# Patient Record
Sex: FEMALE | Race: WHITE | NOT HISPANIC OR LATINO | Employment: FULL TIME | URBAN - METROPOLITAN AREA
[De-identification: names, ages, dates, MRNs, and addresses within clinical notes are randomized per-mention and may not be internally consistent; named-entity substitution may affect disease eponyms.]

---

## 2023-03-31 DIAGNOSIS — I10 HYPERTENSION, UNSPECIFIED TYPE: Primary | ICD-10-CM

## 2023-03-31 DIAGNOSIS — E78.2 MIXED HYPERLIPIDEMIA: ICD-10-CM

## 2023-03-31 RX ORDER — DILTIAZEM HYDROCHLORIDE 180 MG/1
180 CAPSULE, EXTENDED RELEASE ORAL DAILY
Qty: 90 CAPSULE | Refills: 0 | Status: SHIPPED | OUTPATIENT
Start: 2023-03-31 | End: 2023-07-10 | Stop reason: SDUPTHER

## 2023-03-31 RX ORDER — DILTIAZEM HYDROCHLORIDE 180 MG/1
1 CAPSULE, EXTENDED RELEASE ORAL DAILY
COMMUNITY
Start: 2018-12-18 | End: 2023-03-31 | Stop reason: SDUPTHER

## 2023-07-06 RX ORDER — SEMAGLUTIDE 1.34 MG/ML
1 INJECTION, SOLUTION SUBCUTANEOUS
COMMUNITY
Start: 2023-03-24

## 2023-07-06 RX ORDER — METFORMIN HYDROCHLORIDE 500 MG/1
500 TABLET, EXTENDED RELEASE ORAL
COMMUNITY
Start: 2023-01-27 | End: 2023-10-12 | Stop reason: ALTCHOICE

## 2023-07-06 RX ORDER — MONTELUKAST SODIUM 10 MG/1
10 TABLET ORAL NIGHTLY
COMMUNITY
Start: 2022-10-27 | End: 2023-10-26 | Stop reason: ALTCHOICE

## 2023-07-06 RX ORDER — ALBUTEROL SULFATE 90 UG/1
2 AEROSOL, METERED RESPIRATORY (INHALATION) EVERY 4 HOURS PRN
COMMUNITY
Start: 2018-11-24

## 2023-07-10 DIAGNOSIS — I10 HYPERTENSION, UNSPECIFIED TYPE: ICD-10-CM

## 2023-07-10 RX ORDER — DILTIAZEM HYDROCHLORIDE 180 MG/1
180 CAPSULE, EXTENDED RELEASE ORAL DAILY
Qty: 90 CAPSULE | Refills: 2 | Status: SHIPPED | OUTPATIENT
Start: 2023-07-10 | End: 2023-10-12 | Stop reason: SDUPTHER

## 2023-07-10 NOTE — TELEPHONE ENCOUNTER
Rx Refill Request Telephone Encounter    Name:  Stephanie Dumont  :  [id number]  Medication Name:        DILTIAZEM XR 180MG 24HR CAPSULE      Specific Pharmacy location:  [facility]  Date of last appointment:  2023

## 2023-07-10 NOTE — TELEPHONE ENCOUNTER
Rx Refill Request Telephone Encounter    Name:  Stephanie Dumont  :  [id number]  Medication Name:  dilitiazen XR ( DILT-XR) 180 mg 24 hr capsule  Specific Pharmacy location:    [facility] [address] [place] [address]  Phone: [phone] Fax: [phone]  Date of last appointment:  2023  Date of next appointment:  None scheduled  Best number to reach patient:  [phone]

## 2023-10-12 ENCOUNTER — OFFICE VISIT (OUTPATIENT)
Dept: PRIMARY CARE | Facility: CLINIC | Age: 53
End: 2023-10-12
Payer: COMMERCIAL

## 2023-10-12 ENCOUNTER — TELEPHONE (OUTPATIENT)
Dept: PRIMARY CARE | Facility: CLINIC | Age: 53
End: 2023-10-12

## 2023-10-12 VITALS
RESPIRATION RATE: 18 BRPM | HEART RATE: 87 BPM | OXYGEN SATURATION: 100 % | BODY MASS INDEX: 50.78 KG/M2 | WEIGHT: 293 LBS | SYSTOLIC BLOOD PRESSURE: 132 MMHG | TEMPERATURE: 98.3 F | DIASTOLIC BLOOD PRESSURE: 94 MMHG

## 2023-10-12 DIAGNOSIS — F32.A ANXIETY AND DEPRESSION: Primary | ICD-10-CM

## 2023-10-12 DIAGNOSIS — F41.9 ANXIETY AND DEPRESSION: Primary | ICD-10-CM

## 2023-10-12 DIAGNOSIS — G47.00 INSOMNIA, UNSPECIFIED TYPE: ICD-10-CM

## 2023-10-12 DIAGNOSIS — I10 HYPERTENSION, UNSPECIFIED TYPE: ICD-10-CM

## 2023-10-12 PROCEDURE — 3008F BODY MASS INDEX DOCD: CPT | Performed by: NURSE PRACTITIONER

## 2023-10-12 PROCEDURE — 3075F SYST BP GE 130 - 139MM HG: CPT | Performed by: NURSE PRACTITIONER

## 2023-10-12 PROCEDURE — 1036F TOBACCO NON-USER: CPT | Performed by: NURSE PRACTITIONER

## 2023-10-12 PROCEDURE — 99214 OFFICE O/P EST MOD 30 MIN: CPT | Performed by: NURSE PRACTITIONER

## 2023-10-12 PROCEDURE — 3080F DIAST BP >= 90 MM HG: CPT | Performed by: NURSE PRACTITIONER

## 2023-10-12 RX ORDER — TRAZODONE HYDROCHLORIDE 50 MG/1
50 TABLET ORAL NIGHTLY PRN
Qty: 30 TABLET | Refills: 2 | Status: SHIPPED | OUTPATIENT
Start: 2023-10-12 | End: 2024-10-11

## 2023-10-12 RX ORDER — DILTIAZEM HYDROCHLORIDE 180 MG/1
180 CAPSULE, EXTENDED RELEASE ORAL DAILY
Qty: 90 CAPSULE | Refills: 2 | Status: SHIPPED | OUTPATIENT
Start: 2023-10-12

## 2023-10-12 RX ORDER — TRAZODONE HYDROCHLORIDE 50 MG/1
TABLET ORAL
Qty: 30 TABLET | Refills: 0 | Status: SHIPPED | OUTPATIENT
Start: 2023-10-12 | End: 2023-10-12 | Stop reason: DRUGHIGH
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
## 2023-10-12 NOTE — TELEPHONE ENCOUNTER
Patient called regarding a referral to Behavioral Health.Patient would like a call with it.  Stephanie [phone]

## 2023-10-13 NOTE — TELEPHONE ENCOUNTER
Phoned patient for follow-up.  Patient was seen in our office yesterday with depression.  She states she is feeling better today.  She was referred to psychiatry is awaiting appointment.

## 2023-10-18 ENCOUNTER — SOCIAL WORK (OUTPATIENT)
Dept: BEHAVIORAL HEALTH | Facility: CLINIC | Age: 53
End: 2023-10-18
Payer: COMMERCIAL

## 2023-10-18 DIAGNOSIS — F41.9 ANXIETY AND DEPRESSION: ICD-10-CM

## 2023-10-18 DIAGNOSIS — F32.A ANXIETY AND DEPRESSION: ICD-10-CM

## 2023-10-18 PROCEDURE — 90791 PSYCH DIAGNOSTIC EVALUATION: CPT
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
## 2023-10-25 ENCOUNTER — SOCIAL WORK (OUTPATIENT)
Dept: BEHAVIORAL HEALTH | Facility: CLINIC | Age: 53
End: 2023-10-25
Payer: COMMERCIAL

## 2023-10-25 DIAGNOSIS — F43.10 PTSD (POST-TRAUMATIC STRESS DISORDER): ICD-10-CM

## 2023-10-25 PROCEDURE — 90837 PSYTX W PT 60 MINUTES: CPT

## 2023-10-25 NOTE — PROGRESS NOTES
Collaborative Care (CoCM)  Progress Note    Type of Interaction: In Office    Start Time: 3:00pm    End Time: 4:00pm    Patient went back to work one day this past week. Patient states that it was difficult to focus while she was there. Patient continues to drive around during the day and spends time at the library. Patient's sister drives around with her most days. Patient feels guilty that she's doing this because she works full time. Patient processing the police coming to her house. Validated patients feelings, offered support. Provided patient information on grounding and meditation.     Appointment: Scheduled      Interventions Provided: Solution Focused Therapy      Progress Made: Minimum    Response to Intervention: Patient is receptive to using grounding and meditation techniques as coping skills          Plan: Utilize grounding and meditation to help manage anxiety symptoms           Follow Up / Next Appointment:  November 15th at 3pm

## 2023-10-26 ENCOUNTER — OFFICE VISIT (OUTPATIENT)
Dept: BEHAVIORAL HEALTH | Facility: CLINIC | Age: 53
End: 2023-10-26
Payer: COMMERCIAL

## 2023-10-26 VITALS
SYSTOLIC BLOOD PRESSURE: 141 MMHG | HEART RATE: 54 BPM | DIASTOLIC BLOOD PRESSURE: 80 MMHG | BODY MASS INDEX: 45.99 KG/M2 | WEIGHT: 293 LBS | HEIGHT: 67 IN

## 2023-10-26 DIAGNOSIS — F43.0 ACUTE STRESS DISORDER: ICD-10-CM

## 2023-10-26 PROCEDURE — 3008F BODY MASS INDEX DOCD: CPT

## 2023-10-26 PROCEDURE — 1036F TOBACCO NON-USER: CPT

## 2023-10-26 PROCEDURE — 99215 OFFICE O/P EST HI 40 MIN: CPT

## 2023-10-26 RX ORDER — HYDROXYZINE HYDROCHLORIDE 25 MG/1
25 TABLET, FILM COATED ORAL 2 TIMES DAILY PRN
Qty: 60 TABLET | Refills: 0 | Status: SHIPPED | OUTPATIENT
Start: 2023-10-26
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
## 2023-10-28 PROBLEM — F43.0 ACUTE STRESS DISORDER: Status: ACTIVE | Noted: 2023-10-28

## 2023-10-28 NOTE — PATIENT INSTRUCTIONS
Start Hydroxyzine 25mg - twice per day as needed for anxiety and sleep   Please continue seeing Miranda [facility]izo  Follow up visit in 2-3 weeks or sooner if needed  Please call the office with any questions or concerns ([phone])

## 2023-11-15 ENCOUNTER — SOCIAL WORK (OUTPATIENT)
Dept: BEHAVIORAL HEALTH | Facility: CLINIC | Age: 53
End: 2023-11-15
Payer: COMMERCIAL

## 2023-11-15 DIAGNOSIS — F43.10 PTSD (POST-TRAUMATIC STRESS DISORDER): ICD-10-CM

## 2023-11-15 PROCEDURE — 90837 PSYTX W PT 60 MINUTES: CPT

## 2023-11-15 NOTE — PROGRESS NOTES
Collaborative Care (CoCM)  Progress Note    Type of Interaction: In Office    Start Time: 3:00pm    End Time: 4:00pm    Patient has started back at work full time. She's still coming in later in the morning. Patient reports after coming home from work she goes to directly to her bedroom and turns the lights off.  Patient sits in her bedroom till her  comes home from work at  2:30am. She won't go to sleep till he's home. Discussed trying to make herself dinner before going up stairs. Patient thinks about the police  being at her home. Spoke with patient about organizing her house again. She's considering putting some things in storage. Patient discussing her son and will happen moving forward. The unknown makes it difficult to move forward. Provided support and validated feelings. Proved psycho education on trauma.         Appointment: Scheduled      Interventions Provided: Solution Focused Therapy      Progress Made: Moderate    Response to Intervention: Receptive       Plan:   Assist patient in processing trauma and developing coping skills      Follow Up / Next Appointment:  December 6th at 4pm

## 2023-11-16 ENCOUNTER — OFFICE VISIT (OUTPATIENT)
Dept: BEHAVIORAL HEALTH | Facility: CLINIC | Age: 53
End: 2023-11-16
Payer: COMMERCIAL

## 2023-11-16 VITALS
HEIGHT: 67 IN | HEART RATE: 73 BPM | BODY MASS INDEX: 45.99 KG/M2 | SYSTOLIC BLOOD PRESSURE: 164 MMHG | WEIGHT: 293 LBS | DIASTOLIC BLOOD PRESSURE: 84 MMHG

## 2023-11-16 DIAGNOSIS — F43.10 PTSD (POST-TRAUMATIC STRESS DISORDER): ICD-10-CM

## 2023-11-16 DIAGNOSIS — F43.0 ACUTE STRESS DISORDER: ICD-10-CM

## 2023-11-16 PROCEDURE — 1036F TOBACCO NON-USER: CPT

## 2023-11-16 PROCEDURE — 99214 OFFICE O/P EST MOD 30 MIN: CPT

## 2023-11-16 RX ORDER — CYCLOBENZAPRINE HCL 10 MG
10 TABLET ORAL NIGHTLY PRN
COMMUNITY
Start: 2023-11-12

## 2023-11-16 RX ORDER — FLUOXETINE 10 MG/1
10 CAPSULE ORAL DAILY
Qty: 30 CAPSULE | Refills: 1 | Status: SHIPPED | OUTPATIENT
Start: 2023-11-16
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
## 2023-11-17 NOTE — PATIENT INSTRUCTIONS
Start Prozac 10mg daily   Please consider Hydroxyzine 25mg once daily as needed for sleep/anxiety  Follow up visit in 4 weeks or sooner if needed

## 2023-12-06 ENCOUNTER — SOCIAL WORK (OUTPATIENT)
Dept: BEHAVIORAL HEALTH | Facility: CLINIC | Age: 53
End: 2023-12-06
Payer: COMMERCIAL

## 2023-12-06 DIAGNOSIS — F43.10 PTSD (POST-TRAUMATIC STRESS DISORDER): ICD-10-CM

## 2023-12-06 PROCEDURE — 90837 PSYTX W PT 60 MINUTES: CPT

## 2023-12-06 NOTE — PROGRESS NOTES
Collaborative Care (CoCM)  Progress Note    Type of Interaction: In Office    Start Time: 3:00pm    End Time: 4:00pm    Patient states that her son came home shortly before Thanksgiving. She has been spending more time at home since he was released. Patient is reluctant to become too comfortable with the situation should he be sent back to long term. Patient believes things will improve after his court hearing are finished. Patient has been spending more time in the main areas of her house. Patient resigned from her current job. Patient will be starting her new position in two weeks. Patient talking about her son's relationship with his ex-girlfriend. She lives in [place] and they have two children together. Patient hasn't been able to the children in two years. Processed this with patient. Patient is unsure of her holiday plans.     Appointment: Scheduled      Interventions Provided: Solution Focused Therapy      Progress Made: Moderate    Response to Intervention: Receptive     Plan: Continue to assist patient in processing her recent trauma and issues surrounding grief.     Follow Up / Next Appointment:  January 3rd 2pm

## 2024-02-20 ENCOUNTER — PATIENT OUTREACH (OUTPATIENT)
Dept: PRIMARY CARE | Facility: CLINIC | Age: 54
End: 2024-02-20
Payer: COMMERCIAL

## 2024-02-20 NOTE — PROGRESS NOTES
Spoke with patient. She currently is uncertain of which PCP she will be following with. Her current PCP has left the practice and she will be needing to find a new PCP.

## 2024-08-28 DIAGNOSIS — I10 HYPERTENSION, UNSPECIFIED TYPE: ICD-10-CM

## 2024-08-29 RX ORDER — DILTIAZEM HYDROCHLORIDE 180 MG/1
180 CAPSULE, EXTENDED RELEASE ORAL DAILY
Qty: 30 CAPSULE | Refills: 0 | Status: SHIPPED | OUTPATIENT
Start: 2024-08-29 | End: 2024-09-28

## 2024-09-03 ENCOUNTER — APPOINTMENT (OUTPATIENT)
Dept: PRIMARY CARE | Facility: CLINIC | Age: 54
End: 2024-09-03
Payer: COMMERCIAL

## 2024-09-05 ENCOUNTER — APPOINTMENT (OUTPATIENT)
Dept: PRIMARY CARE | Facility: CLINIC | Age: 54
End: 2024-09-05
Payer: COMMERCIAL

## 2024-09-05 VITALS
WEIGHT: 293 LBS | OXYGEN SATURATION: 94 % | HEART RATE: 74 BPM | DIASTOLIC BLOOD PRESSURE: 83 MMHG | TEMPERATURE: 97.8 F | SYSTOLIC BLOOD PRESSURE: 139 MMHG | HEIGHT: 68 IN | BODY MASS INDEX: 44.41 KG/M2

## 2024-09-05 DIAGNOSIS — R73.03 PREDIABETES: ICD-10-CM

## 2024-09-05 DIAGNOSIS — E66.01 CLASS 3 SEVERE OBESITY DUE TO EXCESS CALORIES WITHOUT SERIOUS COMORBIDITY WITH BODY MASS INDEX (BMI) OF 50.0 TO 59.9 IN ADULT (MULTI): Primary | ICD-10-CM

## 2024-09-05 DIAGNOSIS — J45.20 MILD INTERMITTENT ASTHMA, UNSPECIFIED WHETHER COMPLICATED (HHS-HCC): ICD-10-CM

## 2024-09-05 PROCEDURE — 3008F BODY MASS INDEX DOCD: CPT | Performed by: FAMILY MEDICINE

## 2024-09-05 PROCEDURE — 1036F TOBACCO NON-USER: CPT | Performed by: FAMILY MEDICINE

## 2024-09-05 PROCEDURE — 99214 OFFICE O/P EST MOD 30 MIN: CPT | Performed by: FAMILY MEDICINE

## 2024-09-05 ASSESSMENT — PATIENT HEALTH QUESTIONNAIRE - PHQ9
SUM OF ALL RESPONSES TO PHQ9 QUESTIONS 1 AND 2: 0
1. LITTLE INTEREST OR PLEASURE IN DOING THINGS: NOT AT ALL
2. FEELING DOWN, DEPRESSED OR HOPELESS: NOT AT ALL

## 2024-09-05 NOTE — PROGRESS NOTES
Subjective   Patient ID: Stephanie Dumont is a 53 y.o. female who presents for Knee Pain (Tightness in the right has been present for a couple of days/Constant dull pain).    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  Weight loss: would like to restart Ozepmic  Cold/heat induced asthma.    Review of Systems   All other systems reviewed and are negative.      Objective   /83   Pulse 74   Temp 36.6 °C (97.8 °F)   SpO2 94%     Physical Exam  Vitals and nursing note reviewed.   Cardiovascular:      Rate and Rhythm: Normal rate and regular rhythm.   Pulmonary:      Effort: Pulmonary effort is normal.      Breath sounds: Normal breath sounds.   Musculoskeletal:      Cervical back: Neck supple.   Lymphadenopathy:      Cervical: No cervical adenopathy.   Neurological:      Mental Status: She is alert.   Psychiatric:         Mood and Affect: Mood normal.         Behavior: Behavior normal.         Thought Content: Thought content normal.         Judgment: Judgment normal.         Assessment/Plan   Diagnoses and all orders for this visit:  Class 3 severe obesity due to excess calories without serious comorbidity with body mass index (BMI) of 50.0 to 59.9 in adult (Multi)  -     Referral to Clinical Pharmacy; Future  -     Lipid panel; Future  -     TSH; Future  -     Hemoglobin A1c; Future  -     CBC and Auto Differential; Future  -     Comprehensive Metabolic Panel; Future  Mild intermittent asthma, unspecified whether complicated (HHS-HCC)  Prediabetes  Other orders  -     Follow Up In Primary Care - Established; Future

## 2024-10-03 ENCOUNTER — APPOINTMENT (OUTPATIENT)
Dept: PRIMARY CARE | Facility: CLINIC | Age: 54
End: 2024-10-03
Payer: COMMERCIAL

## 2024-10-03 ENCOUNTER — TELEPHONE (OUTPATIENT)
Dept: PRIMARY CARE | Facility: CLINIC | Age: 54
End: 2024-10-03

## 2024-10-03 DIAGNOSIS — I10 HYPERTENSION, UNSPECIFIED TYPE: ICD-10-CM

## 2024-10-03 RX ORDER — DILTIAZEM HYDROCHLORIDE 180 MG/1
180 CAPSULE, EXTENDED RELEASE ORAL DAILY
Qty: 90 CAPSULE | Refills: 1 | Status: SHIPPED | OUTPATIENT
Start: 2024-10-03 | End: 2025-04-01

## 2024-10-04 ENCOUNTER — APPOINTMENT (OUTPATIENT)
Dept: PRIMARY CARE | Facility: CLINIC | Age: 54
End: 2024-10-04
Payer: COMMERCIAL

## 2024-10-07 ENCOUNTER — LAB (OUTPATIENT)
Dept: LAB | Facility: LAB | Age: 54
End: 2024-10-07
Payer: COMMERCIAL

## 2024-10-07 DIAGNOSIS — E66.813 CLASS 3 SEVERE OBESITY DUE TO EXCESS CALORIES WITHOUT SERIOUS COMORBIDITY WITH BODY MASS INDEX (BMI) OF 50.0 TO 59.9 IN ADULT: ICD-10-CM

## 2024-10-07 DIAGNOSIS — E66.01 CLASS 3 SEVERE OBESITY DUE TO EXCESS CALORIES WITHOUT SERIOUS COMORBIDITY WITH BODY MASS INDEX (BMI) OF 50.0 TO 59.9 IN ADULT: ICD-10-CM

## 2024-10-07 LAB
ALBUMIN SERPL BCP-MCNC: 4.3 G/DL (ref 3.4–5)
ALP SERPL-CCNC: 129 U/L (ref 33–110)
ALT SERPL W P-5'-P-CCNC: 13 U/L (ref 7–45)
ANION GAP SERPL CALC-SCNC: 12 MMOL/L (ref 10–20)
AST SERPL W P-5'-P-CCNC: 13 U/L (ref 9–39)
BASOPHILS # BLD AUTO: 0.04 X10*3/UL (ref 0–0.1)
BASOPHILS NFR BLD AUTO: 0.5 %
BILIRUB SERPL-MCNC: 0.4 MG/DL (ref 0–1.2)
BUN SERPL-MCNC: 15 MG/DL (ref 6–23)
CALCIUM SERPL-MCNC: 9.3 MG/DL (ref 8.6–10.3)
CHLORIDE SERPL-SCNC: 104 MMOL/L (ref 98–107)
CHOLEST SERPL-MCNC: 220 MG/DL (ref 0–199)
CHOLESTEROL/HDL RATIO: 5.7
CO2 SERPL-SCNC: 30 MMOL/L (ref 21–32)
CREAT SERPL-MCNC: 0.97 MG/DL (ref 0.5–1.05)
EGFRCR SERPLBLD CKD-EPI 2021: 70 ML/MIN/1.73M*2
EOSINOPHIL # BLD AUTO: 0.15 X10*3/UL (ref 0–0.7)
EOSINOPHIL NFR BLD AUTO: 1.9 %
ERYTHROCYTE [DISTWIDTH] IN BLOOD BY AUTOMATED COUNT: 12.5 % (ref 11.5–14.5)
GLUCOSE SERPL-MCNC: 92 MG/DL (ref 74–99)
HCT VFR BLD AUTO: 42.6 % (ref 36–46)
HDLC SERPL-MCNC: 38.3 MG/DL
HGB BLD-MCNC: 13 G/DL (ref 12–16)
IMM GRANULOCYTES # BLD AUTO: 0.02 X10*3/UL (ref 0–0.7)
IMM GRANULOCYTES NFR BLD AUTO: 0.3 % (ref 0–0.9)
LDLC SERPL CALC-MCNC: 155 MG/DL
LYMPHOCYTES # BLD AUTO: 3.89 X10*3/UL (ref 1.2–4.8)
LYMPHOCYTES NFR BLD AUTO: 49 %
MCH RBC QN AUTO: 28 PG (ref 26–34)
MCHC RBC AUTO-ENTMCNC: 30.5 G/DL (ref 32–36)
MCV RBC AUTO: 92 FL (ref 80–100)
MONOCYTES # BLD AUTO: 0.6 X10*3/UL (ref 0.1–1)
MONOCYTES NFR BLD AUTO: 7.6 %
NEUTROPHILS # BLD AUTO: 3.24 X10*3/UL (ref 1.2–7.7)
NEUTROPHILS NFR BLD AUTO: 40.7 %
NON HDL CHOLESTEROL: 182 MG/DL (ref 0–149)
NRBC BLD-RTO: 0 /100 WBCS (ref 0–0)
PLATELET # BLD AUTO: 338 X10*3/UL (ref 150–450)
POTASSIUM SERPL-SCNC: 4.4 MMOL/L (ref 3.5–5.3)
PROT SERPL-MCNC: 7.1 G/DL (ref 6.4–8.2)
RBC # BLD AUTO: 4.64 X10*6/UL (ref 4–5.2)
SODIUM SERPL-SCNC: 142 MMOL/L (ref 136–145)
TRIGL SERPL-MCNC: 134 MG/DL (ref 0–149)
TSH SERPL-ACNC: 0.39 MIU/L (ref 0.44–3.98)
VLDL: 27 MG/DL (ref 0–40)
WBC # BLD AUTO: 7.9 X10*3/UL (ref 4.4–11.3)

## 2024-10-07 PROCEDURE — 84443 ASSAY THYROID STIM HORMONE: CPT

## 2024-10-07 PROCEDURE — 85025 COMPLETE CBC W/AUTO DIFF WBC: CPT

## 2024-10-07 PROCEDURE — 83036 HEMOGLOBIN GLYCOSYLATED A1C: CPT

## 2024-10-07 PROCEDURE — 80061 LIPID PANEL: CPT

## 2024-10-07 PROCEDURE — 36415 COLL VENOUS BLD VENIPUNCTURE: CPT

## 2024-10-07 PROCEDURE — 80053 COMPREHEN METABOLIC PANEL: CPT

## 2024-10-08 LAB
EST. AVERAGE GLUCOSE BLD GHB EST-MCNC: 137 MG/DL
HBA1C MFR BLD: 6.4 %

## 2024-10-10 NOTE — PROGRESS NOTES
Clinical Pharmacy Appointment    Patient ID: Stephanie Dumont is a 53 y.o. female who presents for Prediabetes.    Pt is here for First appointment.     Referring Provider: Kian Kowalski MD  PCP: Kian Kowalski MD   Last visit with PCP: 9/5/24   Next visit with PCP: not yet scheduled      Subjective   HPI  Pre-Diabetes  PMH significant for HLD, asthma, prediabetes .  Special needs/barriers to therapy: None identified    BMI Readings from Last 1 Encounters:   09/05/24 52.00 kg/m²     Lab Results   Component Value Date    HGBA1C 6.4 (H) 10/07/2024    GLUCOSE 92 10/07/2024   Hyperglycemia: Denies signs and symptoms    Lifestyle  Diet: making healthier choices as possible    Physical Activity: minimal    Pharmacological Therapy  Current Medications: none  Previous Medications:   - Metformin  - Ozempic      Adherence: Takes medication as directed and reports no missed doses  Adverse Effects: n/a    Insurance coverage of weight-loss medications? No, none covered  Eligible for copay cards/programs? Yes, commercial insurance    Preventative Care  ASCVD Risk:   The 10-year ASCVD risk score (Josafat DK, et al., 2019) is: 8.6%    Values used to calculate the score:      Age: 53 years      Sex: Female      Is Non- : No      Diabetic: Yes      Tobacco smoker: No      Systolic Blood Pressure: 139 mmHg      Is BP treated: Yes      HDL Cholesterol: 38.3 mg/dL      Total Cholesterol: 220 mg/dL  On Statin?: No,      Tobacco Use: non-smoker    Drug Interactions  No relevant drug interactions were noted.    Objective   Allergies   Allergen Reactions    Aspirin Anaphylaxis and Itching    Sulfa (Sulfonamide Antibiotics) Anaphylaxis    Hydrochlorothiazide Unknown    Lisinopril Cough    Sulfamethoxazole Itching     Social History     Social History Narrative    Not on file      Medication Review  Current Outpatient Medications   Medication Instructions    albuterol 90 mcg/actuation inhaler 2 puffs,  "inhalation, Every 4 hours PRN    cyclobenzaprine (FLEXERIL) 10 mg, oral, Nightly PRN    dilTIAZem XR (DILT-XR) 180 mg, oral, Daily    FLUoxetine (PROZAC) 10 mg, oral, Daily    hydrOXYzine HCL (ATARAX) 25 mg, oral, 2 times daily PRN    Ozempic 0.25 mg, subcutaneous, Every 7 days    traZODone (DESYREL) 50 mg, oral, Nightly PRN      Vitals  BP Readings from Last 2 Encounters:   09/05/24 139/83   11/16/23 164/84     BMI Readings from Last 1 Encounters:   09/05/24 52.00 kg/m²      Labs  A1C  Lab Results   Component Value Date    HGBA1C 6.4 (H) 10/07/2024    HGBA1C 6.2 02/10/2020    HGBA1C 5.9 04/16/2018     BMP  Lab Results   Component Value Date    CALCIUM 9.3 10/07/2024     10/07/2024    K 4.4 10/07/2024    CO2 30 10/07/2024     10/07/2024    BUN 15 10/07/2024    CREATININE 0.97 10/07/2024    EGFR 70 10/07/2024     LFTs  Lab Results   Component Value Date    ALT 13 10/07/2024    AST 13 10/07/2024    ALKPHOS 129 (H) 10/07/2024    BILITOT 0.4 10/07/2024     FLP  Lab Results   Component Value Date    TRIG 134 10/07/2024    CHOL 220 (H) 10/07/2024    LDLF 143 (H) 07/16/2019    LDLCALC 155 (H) 10/07/2024    HDL 38.3 10/07/2024     Urine Microalbumin  No results found for: \"MICROALBCREA\"  Weight Management  Wt Readings from Last 3 Encounters:   09/05/24 (!) 155 kg (342 lb)   11/16/23 (!) 155 kg (342 lb)   10/26/23 (!) 155 kg (341 lb)      There is no height or weight on file to calculate BMI.     Assessment/Plan   Problem List Items Addressed This Visit    None  Visit Diagnoses       Prediabetes    -  Primary    Relevant Medications    semaglutide (Ozempic) 0.25 mg or 0.5 mg (2 mg/3 mL) pen injector    Other Relevant Orders    Referral to Clinical Pharmacy    Class 3 severe obesity due to excess calories without serious comorbidity with body mass index (BMI) of 50.0 to 59.9 in adult        Relevant Medications    semaglutide (Ozempic) 0.25 mg or 0.5 mg (2 mg/3 mL) pen injector    Other Relevant Orders    Referral " to Clinical Pharmacy          Patient's A1c has been steadily increasing overtime since stopping Ozempic in the past, currently at 6.4%, up from <6%. She was previously on Ozempic for her prediabetes because she cannot tolerate metformin, she had severe abdominal discomfort. Will attempt to restart Ozempic, pending PA approval, to prevent her A1c from continuing to increase.     START: Ozempic 0.25 mg once weekly for 4 weeks     Future Considerations:  Titrate Ozempic as tolerated    Monitoring and Education:  Ozempic Education:  - Counseled patient on Ozempic MOA, expectations, administration, and monitoring parameters.   - Counseled patient that Ozempic may cause some GI adverse effects (upset stomach/diarrhea/constipation/nausea/vomiting) when starting out and ways to mitigate include eating smaller meals and limiting greasy or deep fried foods as this increases risk of nausea.   - Advised patient that they may experience improved satiety after meals and portion sizes of meals may be reduced as doses of Ozempic increase.   - Benefits of GLP1-ra in addition to glycemic control include cardioprotection, renal protection and weight loss.   - Reviewed Ozempic titration schedule, starting with 0.25 mg once weekly to a goal of 2 mg once weekly if tolerated.     Clinical Pharmacist follow-up: 2 weeks, Telehealth visit    Continue all meds under the continuation of care with the referring provider and clinical pharmacy team.    Thank you,  Tena Sumner, PharmD  Clinical Pharmacist  [phone]    Verbal consent to manage patient's drug therapy was obtained from the patient. They were informed they may decline to participate or withdraw from participation in pharmacy services at any time.

## 2024-10-14 ENCOUNTER — APPOINTMENT (OUTPATIENT)
Dept: PHARMACY | Facility: HOSPITAL | Age: 54
End: 2024-10-14
Payer: COMMERCIAL

## 2024-10-14 ENCOUNTER — TELEPHONE (OUTPATIENT)
Dept: PRIMARY CARE | Facility: CLINIC | Age: 54
End: 2024-10-14

## 2024-10-14 DIAGNOSIS — R79.89 LOW TSH LEVEL: Primary | ICD-10-CM

## 2024-10-14 DIAGNOSIS — E66.813 CLASS 3 SEVERE OBESITY DUE TO EXCESS CALORIES WITHOUT SERIOUS COMORBIDITY WITH BODY MASS INDEX (BMI) OF 50.0 TO 59.9 IN ADULT: ICD-10-CM

## 2024-10-14 DIAGNOSIS — E66.01 CLASS 3 SEVERE OBESITY DUE TO EXCESS CALORIES WITHOUT SERIOUS COMORBIDITY WITH BODY MASS INDEX (BMI) OF 50.0 TO 59.9 IN ADULT: ICD-10-CM

## 2024-10-14 DIAGNOSIS — R73.03 PREDIABETES: Primary | ICD-10-CM

## 2024-10-14 RX ORDER — SEMAGLUTIDE 0.68 MG/ML
0.25 INJECTION, SOLUTION SUBCUTANEOUS
Qty: 3 ML | Refills: 1 | Status: SHIPPED | OUTPATIENT
Start: 2024-10-14

## 2024-10-14 NOTE — Clinical Note
Will attempt to resubmit the PA for Ozempic, however will likely be denied due to no diabetes diagnosis (only prediabetes A1c 6.4%). If denied, her insurance does not cover weight loss medications, she could be a candidate for compounded GLP1s or you can consider trying one of the oral weight loss options, however those may also not be covered by her insurance. She is eligible for the Wegovy/Zepbound copay cards for $650 per month if insurance denies PA for Ozempic.

## 2024-10-14 NOTE — TELEPHONE ENCOUNTER
----- Message from Kian Kowalski sent at 10/14/2024 12:28 PM EDT -----  Notify patient TSH is low, I added T4,T3 and TSH, she can get it done at earliest. LDL-C is high, recommend mediterranean diet.

## 2024-10-14 NOTE — LETTER
October 14, 2024     Good afternoon Stephanie Dumont your lab results per Dr. Langston: patient TSH is low, I added T4,T3 and TSH, she can get it done at earliest convenience. LDL-C is high, recommend mediterranean diet.        Sincerely,     MONICA HERNANDEZ LPN

## 2024-10-28 ENCOUNTER — APPOINTMENT (OUTPATIENT)
Dept: PHARMACY | Facility: HOSPITAL | Age: 54
End: 2024-10-28
Payer: COMMERCIAL

## 2024-11-04 NOTE — PROGRESS NOTES
Clinical Pharmacy Appointment    Patient ID: Stephanie Dumont is a 53 y.o. female who presents for Obesity.    Pt is here for First appointment.     Referring Provider: Kian Kowalski MD  PCP: Kian Kowalski MD   Last visit with PCP: 9/5/24   Next visit with PCP: not yet scheduled      Subjective   HPI  Pre-Diabetes  PMH significant for HLD, asthma, prediabetes .  Special needs/barriers to therapy: None identified    BMI Readings from Last 1 Encounters:   09/05/24 52.00 kg/m²     Lab Results   Component Value Date    HGBA1C 6.4 (H) 10/07/2024    GLUCOSE 92 10/07/2024   Hyperglycemia: Denies signs and symptoms    Lifestyle  Diet: making healthier choices as possible    Physical Activity: minimal    Pharmacological Therapy  Current Medications: none  Previous Medications:   - Metformin - couldn't tolerate due to severe stomach discomfort   - Ozempic      Adherence: Takes medication as directed and reports no missed doses  Adverse Effects: n/a    Insurance coverage of weight-loss medications? No, plan exclusion  Eligible for copay cards/programs? Yes, commercial insurance    Preventative Care  ASCVD Risk:   The 10-year ASCVD risk score (Josafat ZAMUDIO, et al., 2019) is: 8.6%    Values used to calculate the score:      Age: 53 years      Sex: Female      Is Non- : No      Diabetic: Yes      Tobacco smoker: No      Systolic Blood Pressure: 139 mmHg      Is BP treated: Yes      HDL Cholesterol: 38.3 mg/dL      Total Cholesterol: 220 mg/dL  On Statin?: No,      Tobacco Use: non-smoker    Drug Interactions  No relevant drug interactions were noted.    Objective   Allergies   Allergen Reactions    Aspirin Anaphylaxis and Itching    Sulfa (Sulfonamide Antibiotics) Anaphylaxis    Hydrochlorothiazide Unknown    Lisinopril Cough    Sulfamethoxazole Itching     Social History     Social History Narrative    Not on file      Medication Review  Current Outpatient Medications   Medication  "Instructions    albuterol 90 mcg/actuation inhaler 2 puffs, inhalation, Every 4 hours PRN    cyclobenzaprine (FLEXERIL) 10 mg, oral, Nightly PRN    dilTIAZem XR (DILT-XR) 180 mg, oral, Daily    FLUoxetine (PROZAC) 10 mg, oral, Daily    hydrOXYzine HCL (ATARAX) 25 mg, oral, 2 times daily PRN    traZODone (DESYREL) 50 mg, oral, Nightly PRN      Vitals  BP Readings from Last 2 Encounters:   09/05/24 139/83   11/16/23 164/84     BMI Readings from Last 1 Encounters:   09/05/24 52.00 kg/m²      Labs  A1C  Lab Results   Component Value Date    HGBA1C 6.4 (H) 10/07/2024    HGBA1C 6.2 02/10/2020    HGBA1C 5.9 04/16/2018     BMP  Lab Results   Component Value Date    CALCIUM 9.3 10/07/2024     10/07/2024    K 4.4 10/07/2024    CO2 30 10/07/2024     10/07/2024    BUN 15 10/07/2024    CREATININE 0.97 10/07/2024    EGFR 70 10/07/2024     LFTs  Lab Results   Component Value Date    ALT 13 10/07/2024    AST 13 10/07/2024    ALKPHOS 129 (H) 10/07/2024    BILITOT 0.4 10/07/2024     FLP  Lab Results   Component Value Date    TRIG 134 10/07/2024    CHOL 220 (H) 10/07/2024    LDLF 143 (H) 07/16/2019    LDLCALC 155 (H) 10/07/2024    HDL 38.3 10/07/2024     Urine Microalbumin  No results found for: \"MICROALBCREA\"  Weight Management  Wt Readings from Last 3 Encounters:   09/05/24 (!) 155 kg (342 lb)   11/16/23 (!) 155 kg (342 lb)   10/26/23 (!) 155 kg (341 lb)      There is no height or weight on file to calculate BMI.     Assessment/Plan   Problem List Items Addressed This Visit    None  Visit Diagnoses       Class 3 severe obesity due to excess calories without serious comorbidity with body mass index (BMI) of 50.0 to 59.9 in adult        Prediabetes              The prior authorization for Ozempic was denied because her A1c is not considered diabetic and they do not cover GLP1s for prediabetes. Patient is eligible for  savings cards for Wegovy or Zepbound for $650/month. She does not want to pay that copay and " would rather focus on lifestyle modifications.     CONTINUE: all medications as prescribed     Monitoring and Education:  Ozempic Education:  - Counseled patient on Ozempic MOA, expectations, administration, and monitoring parameters.   - Counseled patient that Ozempic may cause some GI adverse effects (upset stomach/diarrhea/constipation/nausea/vomiting) when starting out and ways to mitigate include eating smaller meals and limiting greasy or deep fried foods as this increases risk of nausea.   - Advised patient that they may experience improved satiety after meals and portion sizes of meals may be reduced as doses of Ozempic increase.   - Benefits of GLP1-ra in addition to glycemic control include cardioprotection, renal protection and weight loss.   - Reviewed Ozempic titration schedule, starting with 0.25 mg once weekly to a goal of 2 mg once weekly if tolerated.     Clinical Pharmacist follow-up: 2 weeks, Telehealth visit    Continue all meds under the continuation of care with the referring provider and clinical pharmacy team.    Thank you,  Tena Sumner, PharmD  Clinical Pharmacist  [phone]    Verbal consent to manage patient's drug therapy was obtained from the patient. They were informed they may decline to participate or withdraw from participation in pharmacy services at any time.

## 2024-11-07 ENCOUNTER — TELEMEDICINE (OUTPATIENT)
Dept: PHARMACY | Facility: HOSPITAL | Age: 54
End: 2024-11-07
Payer: COMMERCIAL

## 2024-11-07 DIAGNOSIS — E66.813 CLASS 3 SEVERE OBESITY DUE TO EXCESS CALORIES WITHOUT SERIOUS COMORBIDITY WITH BODY MASS INDEX (BMI) OF 50.0 TO 59.9 IN ADULT: ICD-10-CM

## 2024-11-07 DIAGNOSIS — R73.03 PREDIABETES: ICD-10-CM

## 2024-11-07 DIAGNOSIS — E66.01 CLASS 3 SEVERE OBESITY DUE TO EXCESS CALORIES WITHOUT SERIOUS COMORBIDITY WITH BODY MASS INDEX (BMI) OF 50.0 TO 59.9 IN ADULT: ICD-10-CM

## 2025-03-25 DIAGNOSIS — I10 HYPERTENSION, UNSPECIFIED TYPE: ICD-10-CM

## 2025-03-25 RX ORDER — DILTIAZEM HYDROCHLORIDE 180 MG/1
180 CAPSULE, EXTENDED RELEASE ORAL DAILY
Qty: 90 CAPSULE | Refills: 1 | Status: SHIPPED | OUTPATIENT
Start: 2025-03-25 | End: 2025-09-21

## 2025-04-09 ENCOUNTER — OFFICE VISIT (OUTPATIENT)
Dept: PRIMARY CARE | Facility: CLINIC | Age: 55
End: 2025-04-09
Payer: COMMERCIAL

## 2025-04-09 VITALS
HEART RATE: 78 BPM | TEMPERATURE: 97.7 F | BODY MASS INDEX: 50.33 KG/M2 | SYSTOLIC BLOOD PRESSURE: 139 MMHG | OXYGEN SATURATION: 94 % | DIASTOLIC BLOOD PRESSURE: 90 MMHG | WEIGHT: 293 LBS

## 2025-04-09 DIAGNOSIS — J40 BRONCHITIS: Primary | ICD-10-CM

## 2025-04-09 PROCEDURE — 99214 OFFICE O/P EST MOD 30 MIN: CPT | Performed by: FAMILY MEDICINE

## 2025-04-09 RX ORDER — PREDNISONE 20 MG/1
40 TABLET ORAL DAILY
Qty: 10 TABLET | Refills: 0 | Status: SHIPPED | OUTPATIENT
Start: 2025-04-09 | End: 2025-04-14

## 2025-04-09 RX ORDER — ATORVASTATIN CALCIUM 40 MG/1
40 TABLET, FILM COATED ORAL DAILY
COMMUNITY
Start: 2024-02-19

## 2025-04-09 ASSESSMENT — PATIENT HEALTH QUESTIONNAIRE - PHQ9
2. FEELING DOWN, DEPRESSED OR HOPELESS: NOT AT ALL
1. LITTLE INTEREST OR PLEASURE IN DOING THINGS: NOT AT ALL
SUM OF ALL RESPONSES TO PHQ9 QUESTIONS 1 AND 2: 0

## 2025-04-09 NOTE — LETTER
April 9, 2025     Patient: Stephanie Dumont   YOB: 1970   Date of Visit: 4/9/2025       To Whom It May Concern:    Stephanie Dumont was seen in my clinic on 4/9/2025 at 11:30 am. Please excuse Stephanie for her absence from work from 4/8/2025 until 4/13/2025 returning to work on 4/14/2025 with not restrictions.    If you have any questions or concerns, please don't hesitate to call.         Sincerely,         Kian Kowalski MD        CC: No Recipients

## 2025-04-09 NOTE — PROGRESS NOTES
Subjective   History was provided by the patient.  Stephanie Dumont is a 54 y.o. female who presents for evaluation of symptoms of a URI. Symptoms include chest congestion, dry cough, and sore throat. Onset of symptoms was 1 week ago, gradually worsening since that time. Associated symptoms include shortness of breath. She is drinking moderate amounts of fluids. Evaluation to date: none. Treatment to date: decongestants    Objective   /90 (BP Location: Left arm, Patient Position: Sitting, BP Cuff Size: Thigh)   Pulse 78   Temp 36.5 °C (97.7 °F) (Temporal)   Wt (!) 150 kg (331 lb)   SpO2 94%   BMI 50.33 kg/m²   Physical Exam  Vitals and nursing note reviewed.   Cardiovascular:      Rate and Rhythm: Normal rate and regular rhythm.   Pulmonary:      Effort: Pulmonary effort is normal.      Breath sounds: Wheezing present.   Neurological:      Mental Status: She is alert.          Assessment/Plan   bronchitis    Suggested symptomatic OTC remedies.  Follow up as needed.

## 2025-04-16 ENCOUNTER — APPOINTMENT (OUTPATIENT)
Dept: PRIMARY CARE | Facility: CLINIC | Age: 55
End: 2025-04-16
Payer: COMMERCIAL

## 2025-04-16 VITALS
RESPIRATION RATE: 19 BRPM | OXYGEN SATURATION: 96 % | DIASTOLIC BLOOD PRESSURE: 86 MMHG | SYSTOLIC BLOOD PRESSURE: 137 MMHG | HEART RATE: 70 BPM

## 2025-04-16 DIAGNOSIS — E66.813 CLASS 3 OBESITY: ICD-10-CM

## 2025-04-16 DIAGNOSIS — R73.03 PREDIABETES: Primary | ICD-10-CM

## 2025-04-16 DIAGNOSIS — Z12.11 COLON CANCER SCREENING: ICD-10-CM

## 2025-04-16 DIAGNOSIS — Z12.31 ENCOUNTER FOR SCREENING MAMMOGRAM FOR MALIGNANT NEOPLASM OF BREAST: ICD-10-CM

## 2025-04-16 DIAGNOSIS — J45.20 MILD INTERMITTENT ASTHMA, UNSPECIFIED WHETHER COMPLICATED (HHS-HCC): ICD-10-CM

## 2025-04-16 PROBLEM — M54.9 CHRONIC BACK PAIN: Status: ACTIVE | Noted: 2025-04-16

## 2025-04-16 PROBLEM — E78.5 DYSLIPIDEMIA: Status: ACTIVE | Noted: 2025-04-16

## 2025-04-16 PROBLEM — G47.10 HYPERSOMNIA: Status: ACTIVE | Noted: 2025-04-16

## 2025-04-16 PROBLEM — R60.0 BILATERAL LEG EDEMA: Status: ACTIVE | Noted: 2025-04-16

## 2025-04-16 PROBLEM — R06.09 DOE (DYSPNEA ON EXERTION): Status: ACTIVE | Noted: 2025-04-16

## 2025-04-16 PROBLEM — E78.2 MIXED HYPERLIPIDEMIA: Status: RESOLVED | Noted: 2023-03-31 | Resolved: 2025-04-16

## 2025-04-16 PROBLEM — G89.29 CHRONIC BACK PAIN: Status: ACTIVE | Noted: 2025-04-16

## 2025-04-16 PROCEDURE — 1036F TOBACCO NON-USER: CPT | Performed by: FAMILY MEDICINE

## 2025-04-16 PROCEDURE — 99214 OFFICE O/P EST MOD 30 MIN: CPT | Performed by: FAMILY MEDICINE

## 2025-04-16 RX ORDER — TIRZEPATIDE 2.5 MG/.5ML
2.5 INJECTION, SOLUTION SUBCUTANEOUS WEEKLY
Qty: 4 PEN | Refills: 0 | Status: SHIPPED | OUTPATIENT
Start: 2025-04-16 | End: 2025-05-16

## 2025-04-16 ASSESSMENT — PAIN SCALES - GENERAL: PAINLEVEL_OUTOF10: 8

## 2025-04-16 NOTE — PROGRESS NOTES
Subjective   Patient ID: Stephanie Dumont is a 54 y.o. female who presents for 1 Month Follow-Up.    HPI     Annual physical  Low back pain, bilateral knee pain: worsening     Took Ozempic for month, stopped due to prescription coverage.    Review of Systems   All other systems reviewed and are negative.      Objective   /86 (BP Location: Left arm, Patient Position: Sitting, BP Cuff Size: Large adult)   Pulse 70   Resp 19   SpO2 96%     Physical Exam  Vitals and nursing note reviewed.   Cardiovascular:      Rate and Rhythm: Normal rate and regular rhythm.   Pulmonary:      Effort: Pulmonary effort is normal.      Breath sounds: Normal breath sounds.   Musculoskeletal:      Cervical back: Neck supple.   Lymphadenopathy:      Cervical: No cervical adenopathy.   Neurological:      Mental Status: She is alert.         Assessment/Plan   Diagnoses and all orders for this visit:  Prediabetes  Mild intermittent asthma, unspecified whether complicated (HHS-HCC)  Class 3 obesity  -     Referral to Bariatric Surgery; Future  -     tirzepatide (Mounjaro) 2.5 mg/0.5 mL pen injector; Inject 2.5 mg under the skin 1 (one) time per week.  Colon cancer screening  -     Cologuard® colon cancer screening; Future  Encounter for screening mammogram for malignant neoplasm of breast  -     BI mammo bilateral screening tomosynthesis; Future  Other orders  -     Follow Up In Primary Care - Established; Future    Meets criteria for Mounjaro.        extensive lysis of adhesions   laparoscopic cholecystectomy   cystic duct and artery clipped

## 2025-05-05 PROBLEM — E66.01 MORBID OBESITY WITH BODY MASS INDEX (BMI) OF 50.0 TO 59.9 IN ADULT (MULTI): Status: ACTIVE | Noted: 2025-05-05

## 2025-05-05 NOTE — PROGRESS NOTES
"Subjective     Date: 5/5/2025 Time: 2:22 PM  Name: Stephanie Dumont  MRN: [id number]    This is a 54 y.o. female with morbid obesity (There is no height or weight on file to calculate BMI.) who presents to clinic for consideration of bariatric surgery. she has attempted and failed multiple diet and exercise regimens for weight loss. Initial Onset of obesity was {Initial Obesity Onset:88264:::0}.  Their goal for surgery is to  {Weight Loss Interest:29719}. The patient has tried multiple diets to lose weight including {Previous Diet Trials:24821}. The patient was most successful with the {Most Successful Diet:19576}. The most pounds lost on this diet were *** lbs. The patient considers their dietary weakness to be {Dietary Weakness:70274} The patient reports a  {Weight Pattern:98241::\"highest weight ever of *** pounds\",\"lowest weight ever of *** pounds\"} {Distribution of Obesity:97280}. Current diet: {Diet:03191}. Compliance: {Compliance:04752} Diet Problems: {Diet Problems:98266} } Dietary Details Include:{Dietary Details:77141} The patient {Exercise Frequency:79403} {Excercise Duration (Optional):76951} {Types of Exercise (Optional):13132}    {Comorbidities:19514}  Problem List[1]    {Procedure Preferred:05952}    {GERDQOL:15290}    PMH: Medical History[2]     PSH: Surgical History[3]     STOPBANG *** (+ ***).   *** personal/family hx of VTE.    FAMILY HISTORY:  Family History[4]     SOCIAL HISTORY:  Social History[5]    MEDICATIONS:  Prior to Admission Medications:  Medication Documentation Review Audit       Reviewed by Kian LARES MD (Physician) on 04/16/25 at 1103      Medication Order Taking? Sig Documenting Provider Last Dose Status   albuterol 90 mcg/actuation inhaler [id number]  Inhale 2 puffs every 4 hours if needed. Historical Provider, MD  Active   atorvastatin (Lipitor) 40 mg tablet [id number]  Take 1 tablet (40 mg) by mouth once daily. Historical Provider, MD  Active   cyclobenzaprine (Flexeril) " 10 mg tablet [id number]  Take 1 tablet (10 mg) by mouth as needed at bedtime. Historical Provider, MD  Active   dilTIAZem XR (DILT-XR) 180 mg 24 hr capsule [id number]  Take 1 capsule (180 mg) by mouth once daily. Kian LARES MD  Active   hydrOXYzine HCL (Atarax) 25 mg tablet [id number]  Take 1 tablet (25 mg) by mouth 2 times a day as needed for anxiety. IBETH Lima  Active     Discontinued 04/16/25 1102   traZODone (Desyrel) 50 mg tablet [id number]  Take 1 tablet (50 mg) by mouth as needed at bedtime for sleep. IBETH Barber  Active                     ALLERGIES:  Allergies[6]    REVIEW OF SYSTEMS:  GENERAL: Negative for malaise, significant weight loss and fever  HEAD: Negative for headache, swelling.  NECK: Negative for lumps, goiter, pain and significant neck swelling  RESPIRATORY: Negative for cough, wheezing or shortness of breath.  CARDIOVASCULAR: Negative for chest pain, leg swelling or palpitations.  GI: Negative for abdominal discomfort, blood in stools or black stools or change in bowel habits  : No history of dysuria, frequency or incontinence  MUSCULOSKELETAL: Negative for joint pain or swelling, back pain or muscle pain.  SKIN: Negative for lesions, rash, and itching.  PSYCH: Negative for sleep disturbance, mood disorder and recent psychosocial stressors.  ENDOCRINE: Negative for cold or heat intolerance, polyuria, polydipsia and goiter.    Objective   PHYSICAL EXAM:  Visit Vitals  OB Status Hysterectomy   Smoking Status Never     General appearance: obese, NAD  Neuro: AOx3  Head: EOMI; no swelling or lesions of scalp or face  ENT:  no lumps or lymphadenopathy, thyroid normal to palpation; oropharynx clear, no swelling or erythema  Skin: warm, no erythema or rashes  Lungs: clear to percussion and auscultation  Heart: regular rhythm and S1, S2 normal  Abdomen: soft, non-tender, no masses, no organomegaly  Extremities: Normal exam of the extremities. No swelling or  pain.  Psych: no hurried speech, no flight of ideas, normal affect    Assessment/Plan   IMPRESSION:  Stephanie Dumont is a 54 y.o. female with a BMI of There is no height or weight on file to calculate BMI. with the following diagnoses and co-morbidities:     Medical History[7]    This patient does meet the criteria for a surgical weight loss procedure according to NIH guidelines.  The risks of sleeve gastrectomy, Alba-en-Y gastric bypass, and duodenal switch surgery including but not limited to bleeding, leak along staple lines, infection, dehydration, ulcers, internal hernia, DVT/PE, pneumonia, myocardial infarction, prolonged nausea/vomiting, incomplete resolution of associated medical conditions, reflux, weight regain, vitamin/mineral deficiencies, and death have been explained to the patient and Stephanie Dumont has expressed understanding and acceptance of them.     We discussed the lifestyle changes necessary to be successful following surgery.    The increased risk of substance and alcohol abuse following bariatric surgery was discussed with the patient, along with the negative consequences of substance/alcohol use after surgery including addiction, worsening of mental health disorders, and injury to the stomach. The risk of smoking and vaping (tobacco or any other substance) after bariatric surgery was explained to the patient. This includes risk of anastamotic ulcers, gastritis, bleeding, perforation, stricture, and PO intolerance.  The patient expressed understanding and acceptance of these risks.    ***The patient was advised not to become pregnant within 12-18 months following bariatric surgery. She was educated on the increased risks to mother and fetus associated with pregnancy within 2 years of bariatric surgery.    The benefits of the above surgeries including weight loss, improvement/resolution of associated medical and mental health conditions, improved mobility, and decreased mortality  have been explained the the patient and Stephanie Dumont has expressed understanding and acceptance of them.      PLAN:  The plan of treatment for Stephanie Dumont is to continue with the consultations and tests ordered today in hopes of qualifying for pre-operative clearance for bariatric surgery. This includes:    Consult Nutrition for education   Consult Psychology  Consult Cardiology  Labs ordered  EGD  PCP for medical optimization  Consult sleep medicine - concern for CYRUS  Recommend at least 15-30 lbs of weight loss prior to surgery.  ***        *** minutes were spent with patient including history, physical exam, and education.           [1]   Patient Active Problem List  Diagnosis    PTSD (post-traumatic stress disorder)    Mild intermittent asthma, unspecified whether complicated (HHS-HCC)    Bilateral leg edema    Chronic back pain    PICHARDO (dyspnea on exertion)    Dyslipidemia    Hypersomnia    Morbid obesity with body mass index (BMI) of 50.0 to 59.9 in adult (Multi)   [2]   Past Medical History:  Diagnosis Date    Asthma     BMI 50.0-59.9, adult (Multi)     Chest pain, atypical     Costochondritis, acute     PICHARDO (dyspnea on exertion)     Dyslipidemia     Hypersomnia     Hypertension     IFG (impaired fasting glucose)     Low TSH level     Type 2 diabetes mellitus    [3]   Past Surgical History:  Procedure Laterality Date     SECTION, CLASSIC      HYSTERECTOMY     [4] No family history on file.  [5]   Social History  Tobacco Use    Smoking status: Never    Smokeless tobacco: Never   [6]   Allergies  Allergen Reactions    Aspirin Anaphylaxis and Itching    Sulfa (Sulfonamide Antibiotics) Anaphylaxis    Hydrochlorothiazide Unknown    Lisinopril Cough    Sulfamethoxazole Itching   [7]   Past Medical History:  Diagnosis Date    Asthma     BMI 50.0-59.9, adult (Multi)     Chest pain, atypical     Costochondritis, acute     PICHARDO (dyspnea on exertion)     Dyslipidemia     Hypersomnia      Hypertension     IFG (impaired fasting glucose)     Low TSH level     Type 2 diabetes mellitus

## 2025-05-12 ENCOUNTER — APPOINTMENT (OUTPATIENT)
Dept: PRIMARY CARE | Facility: CLINIC | Age: 55
End: 2025-05-12
Payer: COMMERCIAL

## 2025-05-20 ENCOUNTER — TELEPHONE (OUTPATIENT)
Dept: SURGERY | Facility: CLINIC | Age: 55
End: 2025-05-20
Payer: COMMERCIAL

## 2025-05-20 NOTE — TELEPHONE ENCOUNTER
Patient cxl'd Bariatric NPV. Called patient and she said she would like to reschedule but will call when ready, I told her we are scheduling in June and she did not want to schedule at this time and will call.  I will close out her referral and wait for her call

## 2025-05-22 ENCOUNTER — APPOINTMENT (OUTPATIENT)
Dept: SURGERY | Facility: CLINIC | Age: 55
End: 2025-05-22
Payer: COMMERCIAL

## 2025-05-22 DIAGNOSIS — Z01.812 PRE-OPERATIVE LABORATORY EXAMINATION: ICD-10-CM

## 2025-05-22 DIAGNOSIS — M54.50 CHRONIC BILATERAL LOW BACK PAIN, UNSPECIFIED WHETHER SCIATICA PRESENT: ICD-10-CM

## 2025-05-22 DIAGNOSIS — Z98.84 BARIATRIC SURGERY STATUS: ICD-10-CM

## 2025-05-22 DIAGNOSIS — Z13.21 ENCOUNTER FOR SCREENING FOR NUTRITIONAL DISORDER: ICD-10-CM

## 2025-05-22 DIAGNOSIS — R60.0 BILATERAL LEG EDEMA: ICD-10-CM

## 2025-05-22 DIAGNOSIS — E66.01 MORBID OBESITY WITH BODY MASS INDEX (BMI) OF 50.0 TO 59.9 IN ADULT (MULTI): ICD-10-CM

## 2025-05-22 DIAGNOSIS — E78.5 DYSLIPIDEMIA: ICD-10-CM

## 2025-05-22 DIAGNOSIS — G89.29 CHRONIC BILATERAL LOW BACK PAIN, UNSPECIFIED WHETHER SCIATICA PRESENT: ICD-10-CM

## 2025-05-22 DIAGNOSIS — R06.09 DOE (DYSPNEA ON EXERTION): ICD-10-CM

## 2025-07-22 ENCOUNTER — APPOINTMENT (OUTPATIENT)
Dept: PRIMARY CARE | Facility: CLINIC | Age: 55
End: 2025-07-22
Payer: COMMERCIAL

## 2025-07-23 ENCOUNTER — OFFICE VISIT (OUTPATIENT)
Dept: PRIMARY CARE | Facility: CLINIC | Age: 55
End: 2025-07-23
Payer: COMMERCIAL

## 2025-07-23 VITALS
DIASTOLIC BLOOD PRESSURE: 86 MMHG | OXYGEN SATURATION: 94 % | BODY MASS INDEX: 50.33 KG/M2 | SYSTOLIC BLOOD PRESSURE: 139 MMHG | TEMPERATURE: 97 F | HEART RATE: 86 BPM | WEIGHT: 293 LBS

## 2025-07-23 DIAGNOSIS — M54.32 LEFT SIDED SCIATICA: Primary | ICD-10-CM

## 2025-07-23 DIAGNOSIS — M54.12 CERVICAL RADICULOPATHY: ICD-10-CM

## 2025-07-23 PROCEDURE — 99214 OFFICE O/P EST MOD 30 MIN: CPT | Performed by: FAMILY MEDICINE

## 2025-07-23 PROCEDURE — 1036F TOBACCO NON-USER: CPT | Performed by: FAMILY MEDICINE

## 2025-07-23 RX ORDER — IBUPROFEN 400 MG/1
400 TABLET, FILM COATED ORAL EVERY 6 HOURS PRN
COMMUNITY
Start: 2025-07-04

## 2025-07-23 ASSESSMENT — PATIENT HEALTH QUESTIONNAIRE - PHQ9
1. LITTLE INTEREST OR PLEASURE IN DOING THINGS: NOT AT ALL
SUM OF ALL RESPONSES TO PHQ9 QUESTIONS 1 AND 2: 0
2. FEELING DOWN, DEPRESSED OR HOPELESS: NOT AT ALL

## 2025-07-23 NOTE — PROGRESS NOTES
"Subjective   Patient ID: Stephanie Dumont is a 54 y.o. female who presents for Follow-up (Sciatica.).    HPI     Low back pain with left sciatica   [facility] : lost 15 lbs with phentamine 15 mg  and mounjaro at 2.5 mg q weekly   Radiates down froom left buttock to left foot  Cervical : numbness right side neck   Review of Systems   All other systems reviewed and are negative.      Objective   /86   Pulse 86   Temp 36.1 °C (97 °F)   Wt (!) 150 kg (331 lb)   SpO2 94%   BMI 50.33 kg/m²     Physical Exam  Vitals and nursing note reviewed.     Cardiovascular:      Rate and Rhythm: Normal rate and regular rhythm.   Pulmonary:      Effort: Pulmonary effort is normal.      Breath sounds: Normal breath sounds.     Musculoskeletal:      Cervical back: Neck supple.   Lymphadenopathy:      Cervical: No cervical adenopathy.     Neurological:      Mental Status: She is alert.         Assessment/Plan   Diagnoses and all orders for this visit:  Left sided sciatica  -     Referral to Physical Therapy; Future  Cervical radiculopathy         "

## 2025-10-16 ENCOUNTER — APPOINTMENT (OUTPATIENT)
Dept: PRIMARY CARE | Facility: CLINIC | Age: 55
End: 2025-10-16
Payer: COMMERCIAL